# Patient Record
Sex: FEMALE | Race: OTHER | HISPANIC OR LATINO | URBAN - METROPOLITAN AREA
[De-identification: names, ages, dates, MRNs, and addresses within clinical notes are randomized per-mention and may not be internally consistent; named-entity substitution may affect disease eponyms.]

---

## 2018-01-06 ENCOUNTER — EMERGENCY (EMERGENCY)
Facility: HOSPITAL | Age: 43
LOS: 1 days | Discharge: HOME | End: 2018-01-06
Admitting: EMERGENCY MEDICINE
Payer: COMMERCIAL

## 2018-01-06 VITALS
HEART RATE: 74 BPM | DIASTOLIC BLOOD PRESSURE: 85 MMHG | TEMPERATURE: 98 F | SYSTOLIC BLOOD PRESSURE: 122 MMHG | RESPIRATION RATE: 18 BRPM | OXYGEN SATURATION: 98 %

## 2018-01-06 DIAGNOSIS — O20.0 THREATENED ABORTION: ICD-10-CM

## 2018-01-06 DIAGNOSIS — Z3A.08 8 WEEKS GESTATION OF PREGNANCY: ICD-10-CM

## 2018-01-06 DIAGNOSIS — N93.9 ABNORMAL UTERINE AND VAGINAL BLEEDING, UNSPECIFIED: ICD-10-CM

## 2018-01-06 LAB
BLD GP AB SCN SERPL QL: NEGATIVE — SIGNIFICANT CHANGE UP
HCG SERPL-ACNC: SIGNIFICANT CHANGE UP MIU/ML
RH IG SCN BLD-IMP: POSITIVE — SIGNIFICANT CHANGE UP

## 2018-01-06 PROCEDURE — 99284 EMERGENCY DEPT VISIT MOD MDM: CPT | Mod: 25

## 2018-01-06 RX ORDER — KETOROLAC TROMETHAMINE 30 MG/ML
30 SYRINGE (ML) INJECTION ONCE
Qty: 0 | Refills: 0 | Status: DISCONTINUED | OUTPATIENT
Start: 2018-01-06 | End: 2018-01-06

## 2018-01-06 RX ADMIN — Medication 30 MILLIGRAM(S): at 04:55

## 2018-01-06 NOTE — ED PROVIDER NOTE - PHYSICAL EXAMINATION
vaginal exam: heavy bleeding with large clots- cervix dilated to 3cm    VITAL SIGNS: I have reviewed nursing notes and confirm.  CONSTITUTIONAL: Well-developed; well-nourished; in no acute distress.  SKIN: Skin is warm and dry, no acute rash.  HEAD: Normocephalic; atraumatic.  EYES: PERRL, EOM intact; conjunctiva and sclera clear.  ENT: No nasal discharge; airway clear.  NECK: Supple; non tender.  CARD: S1, S2 normal; no murmurs, gallops, or rubs. Regular rate and rhythm.  RESP: No wheezes, rales or rhonchi.  ABD: Normal bowel sounds; soft; non-distended; non-tender; no hepatosplenomegaly.  EXT: Normal ROM. No clubbing, cyanosis or edema.  NEURO: Alert, oriented. Grossly unremarkable.  PSYCH: Cooperative, appropriate.

## 2018-01-06 NOTE — ED ADULT TRIAGE NOTE - CHIEF COMPLAINT QUOTE
"I think I'm having a miscarriage." Pt reports she is approximately 8 weeks pregnant, and noticed vaginal spotting that started yesterday morning. States tonight, she noticed bleeding and clots. Reports lower abdominal cramping.

## 2018-01-06 NOTE — ED PROVIDER NOTE - OBJECTIVE STATEMENT
41 y/o F     no PMHx    Pt presents to ER with c/o heavy vaginal bleeding that began this evening 2/2 low abd cramping. Pt states she was told by her OBGYN that she is ~8 weeks pregnant. Had US done in office 1 week ago and was told that she had an IUP- however it did not look viable. Was told by OBGYN to expect a miscarriage. Pt has appointment with [facility] for follow up US on monday. Came for pain control and "check up to make sure that this was all normal". Howard lightheadedness, near syncope, chest pain, shortness of breath, urinary sxs, N/V/D

## 2018-01-06 NOTE — ED PROVIDER NOTE - MEDICAL DECISION MAKING DETAILS
in progress- will give toradol for cramps. sent type/screen. Pt has appt with [facility] monday morning for repeat US. counseled to return if pain or bleeding worsen.

## 2021-07-20 NOTE — ED ADULT NURSE NOTE - PRIMARY CARE PROVIDER
none Mercedes Flap Text: The defect edges were debeveled with a #15 scalpel blade.  Given the location of the defect, shape of the defect and the proximity to free margins a Mercedes flap was deemed most appropriate.  Using a sterile surgical marker, an appropriate advancement flap was drawn incorporating the defect and placing the expected incisions within the relaxed skin tension lines where possible. The area thus outlined was incised deep to adipose tissue with a #15 scalpel blade.  The skin margins were undermined to an appropriate distance in all directions utilizing iris scissors.
